# Patient Record
Sex: MALE | Race: WHITE | Employment: FULL TIME | URBAN - METROPOLITAN AREA
[De-identification: names, ages, dates, MRNs, and addresses within clinical notes are randomized per-mention and may not be internally consistent; named-entity substitution may affect disease eponyms.]

---

## 2017-06-21 ENCOUNTER — HOSPITAL ENCOUNTER (OUTPATIENT)
Facility: MEDICAL CENTER | Age: 52
End: 2017-06-21
Attending: INTERNAL MEDICINE
Payer: COMMERCIAL

## 2017-06-21 ENCOUNTER — OFFICE VISIT (OUTPATIENT)
Dept: MEDICAL GROUP | Facility: PHYSICIAN GROUP | Age: 52
End: 2017-06-21
Payer: COMMERCIAL

## 2017-06-21 VITALS
TEMPERATURE: 97.9 F | WEIGHT: 191 LBS | HEART RATE: 73 BPM | HEIGHT: 68 IN | OXYGEN SATURATION: 94 % | BODY MASS INDEX: 28.95 KG/M2 | SYSTOLIC BLOOD PRESSURE: 150 MMHG | DIASTOLIC BLOOD PRESSURE: 94 MMHG

## 2017-06-21 DIAGNOSIS — R39.9 URINARY TRACT INFECTION SYMPTOMS: ICD-10-CM

## 2017-06-21 DIAGNOSIS — R39.9 SYMPTOMS INVOLVING URINARY SYSTEM: ICD-10-CM

## 2017-06-21 DIAGNOSIS — M25.522 LEFT ELBOW PAIN: ICD-10-CM

## 2017-06-21 DIAGNOSIS — L85.9 HYPERKERATOSIS: ICD-10-CM

## 2017-06-21 LAB
APPEARANCE UR: CLEAR
BILIRUB UR STRIP-MCNC: NORMAL MG/DL
COLOR UR AUTO: YELLOW
GLUCOSE UR STRIP.AUTO-MCNC: NORMAL MG/DL
KETONES UR STRIP.AUTO-MCNC: NORMAL MG/DL
LEUKOCYTE ESTERASE UR QL STRIP.AUTO: NORMAL
NITRITE UR QL STRIP.AUTO: NORMAL
PH UR STRIP.AUTO: 6.5 [PH] (ref 5–8)
PROT UR QL STRIP: NORMAL MG/DL
RBC UR QL AUTO: NORMAL
SP GR UR STRIP.AUTO: 1.01
UROBILINOGEN UR STRIP-MCNC: NORMAL MG/DL

## 2017-06-21 PROCEDURE — 81002 URINALYSIS NONAUTO W/O SCOPE: CPT | Performed by: INTERNAL MEDICINE

## 2017-06-21 PROCEDURE — 81003 URINALYSIS AUTO W/O SCOPE: CPT

## 2017-06-21 PROCEDURE — 87491 CHLMYD TRACH DNA AMP PROBE: CPT

## 2017-06-21 PROCEDURE — 87591 N.GONORRHOEAE DNA AMP PROB: CPT

## 2017-06-21 PROCEDURE — 99213 OFFICE O/P EST LOW 20 MIN: CPT | Performed by: INTERNAL MEDICINE

## 2017-06-21 ASSESSMENT — PATIENT HEALTH QUESTIONNAIRE - PHQ9: CLINICAL INTERPRETATION OF PHQ2 SCORE: 0

## 2017-06-21 ASSESSMENT — PAIN SCALES - GENERAL: PAINLEVEL: NO PAIN

## 2017-06-21 NOTE — PROGRESS NOTES
Chief Complaint   Patient presents with   • Dysuria     burning when urinating, flow goes out to the side when urinating       HISTORY OF PRESENT ILLNESS: Patient is a 51 y.o. male established patient who presents today to discuss the medical issues below.    Symptoms involving urinary system  Few mos of intermittent and waxing and waning, now with pretty continuous dysuria, worse after voiding, no blood.   and monogamous.      Hyperkeratosis  Spots on neck he wants checked.        Patient Active Problem List    Diagnosis Date Noted   • Symptoms involving urinary system 06/21/2017   • Left elbow pain 06/21/2017   • Hyperkeratosis 06/21/2017   • Reflux esophagitis 10/22/2015   • Post-traumatic osteoarthritis of right ankle 07/23/2015   • Mass of finger 02/25/2013   • Elevated BP 11/13/2009   • Pure hypercholesterolemia 08/21/2009   • Constipation        Allergies:Review of patient's allergies indicates no known allergies.    Current Outpatient Prescriptions   Medication Sig Dispense Refill   • MULTIVITAMIN PO Take 1 Tab by mouth every day.       No current facility-administered medications for this visit.         Past Medical History   Diagnosis Date   • Pure hypercholesterolemia    • Constipation    • Other specified disorder of rectum and anus 9/2006     Proctitis    • Fatty liver    • S/P colonoscopy 2002     Benign   • Hiatal hernia    • Elevated BP 11/13/2009   • Mass of finger 2/25/2013   • Post-traumatic osteoarthritis of right ankle 7/23/2015   • Reflux esophagitis 10/22/2015   • Urinary tract infection symptoms 6/21/2017   • Symptoms involving urinary system 6/21/2017   • Hyperkeratosis 6/21/2017       Social History   Substance Use Topics   • Smoking status: Never Smoker    • Smokeless tobacco: Never Used   • Alcohol Use: 1.0 oz/week     2 drink(s) per week      Comment: occassional       Family Status   Relation Status Death Age   • Mother Alive      htn   • Father Alive    • Sister Alive    •  "Brother       wbc cancer     Family History   Problem Relation Age of Onset   • Hypertension Mother        ROS:    Respiratory: Negative for cough, sputum production, shortness of breath or wheezing.    Cardiovascular: Negative for chest pain, palpitations, orthopnea, dyspnea with exertion or edema.   Gastrointestinal: Negative for GI upset, nausea, vomiting, abdominal pain, constipation or diarrhea.   Genitourinary: Negative for dysuria, urgency, hesitancy or frequency.       Exam:    Blood pressure 150/94, pulse 73, temperature 36.6 °C (97.9 °F), height 1.727 m (5' 7.99\"), weight 86.637 kg (191 lb), SpO2 94 %.  General:  Well nourished, well developed male in NAD.  Skin: Multiple skin tags no signs of inflammation discoloration or pigmentation.  Pulmonary: Clear to ausculation and percussion.  Normal effort. No rales, rhonchi, or wheezing.  Cardiovascular: Regular rate and rhythm without murmur.   Abdomen: Normal bowel sounds soft and nontender no palpable liver spleen bladder mass.  : normal external male genitalia, bilateral descended testis, no masses, herniations, tenderness or rashes. Rectal with normal tone, no hemorrhoids, prostate is normal and symmetrically bilaterally sans masses.      Psych: Alert and oriented to person, place, and time. Appropriate mood and conversation.  Extremity: Minimal tenderness over the right olecranon process minimal increase with supination and no weakness      This dictation was created using voice recognition software. I have made reasonable attempts to correct errors, however, errors of grammar and content may exist.          Assessment/Plan:    1. Urinary tract infection symptoms  Most consistent with urethral stenosis. Prostate unremarkable urinalysis negative for consistency of urinary tract infection. Discussed with patient obtaining GC chlamydia by urine referral to urology for urethral Skippy  - URINALYSIS; Future    2. Symptoms involving urinary system  As " above  - CHLAMYDIA/GC PCR URINE OR SWAB; Future  - REFERRAL TO UROLOGY    3. 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 Most consistent with strain discussed position of function monitor with over-the-counter nonsteroidals     4. Hyperkeratosis  Benign hyperkeratosis liquid nitrogen applied if persisting will need scissor excision.       Patient was seen for  15 minutes face to face of which more than 50% of the time was spent in counseling and coordination of care regarding the above problems.

## 2017-06-21 NOTE — ASSESSMENT & PLAN NOTE
Few mos of intermittent and waxing and waning, now with pretty continuous dysuria, worse after voiding, no blood.   and monogamous.

## 2017-06-22 LAB
APPEARANCE UR: CLEAR
BILIRUB UR QL STRIP.AUTO: NEGATIVE
C TRACH DNA SPEC QL NAA+PROBE: NEGATIVE
COLOR UR: YELLOW
GLUCOSE UR STRIP.AUTO-MCNC: NEGATIVE MG/DL
KETONES UR STRIP.AUTO-MCNC: NEGATIVE MG/DL
LEUKOCYTE ESTERASE UR QL STRIP.AUTO: NEGATIVE
MICRO URNS: NORMAL
N GONORRHOEA DNA SPEC QL NAA+PROBE: NEGATIVE
NITRITE UR QL STRIP.AUTO: NEGATIVE
PH UR STRIP.AUTO: 7 [PH]
PROT UR QL STRIP: NEGATIVE MG/DL
RBC UR QL AUTO: NEGATIVE
SP GR UR STRIP.AUTO: 1.01
SPECIMEN SOURCE: NORMAL

## 2019-02-26 ENCOUNTER — HOSPITAL ENCOUNTER (OUTPATIENT)
Facility: MEDICAL CENTER | Age: 54
End: 2019-02-26
Attending: PHYSICIAN ASSISTANT
Payer: COMMERCIAL

## 2019-02-26 ENCOUNTER — OCCUPATIONAL MEDICINE (OUTPATIENT)
Dept: URGENT CARE | Facility: PHYSICIAN GROUP | Age: 54
End: 2019-02-26

## 2019-02-26 ENCOUNTER — APPOINTMENT (OUTPATIENT)
Dept: RADIOLOGY | Facility: IMAGING CENTER | Age: 54
End: 2019-02-26
Attending: PHYSICIAN ASSISTANT

## 2019-02-26 VITALS
BODY MASS INDEX: 29.4 KG/M2 | SYSTOLIC BLOOD PRESSURE: 110 MMHG | HEART RATE: 73 BPM | WEIGHT: 194 LBS | OXYGEN SATURATION: 97 % | HEIGHT: 68 IN | TEMPERATURE: 97.7 F | DIASTOLIC BLOOD PRESSURE: 80 MMHG | RESPIRATION RATE: 16 BRPM

## 2019-02-26 DIAGNOSIS — Z02.89 ENCOUNTER FOR PHYSICAL EXAMINATION RELATED TO EMPLOYMENT: ICD-10-CM

## 2019-02-26 DIAGNOSIS — Z02.89 ENCOUNTER FOR PHYSICAL EXAMINATION RELATED TO EMPLOYMENT: Primary | ICD-10-CM

## 2019-02-26 LAB
ALBUMIN SERPL BCP-MCNC: 4.8 G/DL (ref 3.2–4.9)
ALBUMIN/GLOB SERPL: 1.8 G/DL
ALP SERPL-CCNC: 72 U/L (ref 30–99)
ALT SERPL-CCNC: 34 U/L (ref 2–50)
ANION GAP SERPL CALC-SCNC: 4 MMOL/L (ref 0–11.9)
AST SERPL-CCNC: 21 U/L (ref 12–45)
BASOPHILS # BLD AUTO: 1 % (ref 0–1.8)
BASOPHILS # BLD: 0.07 K/UL (ref 0–0.12)
BILIRUB SERPL-MCNC: 0.6 MG/DL (ref 0.1–1.5)
BUN SERPL-MCNC: 16 MG/DL (ref 8–22)
CALCIUM SERPL-MCNC: 10.2 MG/DL (ref 8.5–10.5)
CHLORIDE SERPL-SCNC: 102 MMOL/L (ref 96–112)
CO2 SERPL-SCNC: 31 MMOL/L (ref 20–33)
CREAT SERPL-MCNC: 0.82 MG/DL (ref 0.5–1.4)
EOSINOPHIL # BLD AUTO: 0.19 K/UL (ref 0–0.51)
EOSINOPHIL NFR BLD: 2.8 % (ref 0–6.9)
ERYTHROCYTE [DISTWIDTH] IN BLOOD BY AUTOMATED COUNT: 42.1 FL (ref 35.9–50)
GLOBULIN SER CALC-MCNC: 2.6 G/DL (ref 1.9–3.5)
GLUCOSE SERPL-MCNC: 83 MG/DL (ref 65–99)
HCT VFR BLD AUTO: 50 % (ref 42–52)
HGB BLD-MCNC: 16.3 G/DL (ref 14–18)
IMM GRANULOCYTES # BLD AUTO: 0.01 K/UL (ref 0–0.11)
IMM GRANULOCYTES NFR BLD AUTO: 0.1 % (ref 0–0.9)
LYMPHOCYTES # BLD AUTO: 3.12 K/UL (ref 1–4.8)
LYMPHOCYTES NFR BLD: 46.8 % (ref 22–41)
MCH RBC QN AUTO: 30 PG (ref 27–33)
MCHC RBC AUTO-ENTMCNC: 32.6 G/DL (ref 33.7–35.3)
MCV RBC AUTO: 91.9 FL (ref 81.4–97.8)
MONOCYTES # BLD AUTO: 0.42 K/UL (ref 0–0.85)
MONOCYTES NFR BLD AUTO: 6.3 % (ref 0–13.4)
NEUTROPHILS # BLD AUTO: 2.86 K/UL (ref 1.82–7.42)
NEUTROPHILS NFR BLD: 43 % (ref 44–72)
NRBC # BLD AUTO: 0 K/UL
NRBC BLD-RTO: 0 /100 WBC
PLATELET # BLD AUTO: 286 K/UL (ref 164–446)
PMV BLD AUTO: 9.4 FL (ref 9–12.9)
POTASSIUM SERPL-SCNC: 4.1 MMOL/L (ref 3.6–5.5)
PROT SERPL-MCNC: 7.4 G/DL (ref 6–8.2)
PSA SERPL-MCNC: 1.11 NG/ML (ref 0–4)
RBC # BLD AUTO: 5.44 M/UL (ref 4.7–6.1)
SODIUM SERPL-SCNC: 137 MMOL/L (ref 135–145)
WBC # BLD AUTO: 6.7 K/UL (ref 4.8–10.8)

## 2019-02-26 PROCEDURE — 84202 ASSAY RBC PROTOPORPHYRIN: CPT | Performed by: PHYSICIAN ASSISTANT

## 2019-02-26 PROCEDURE — 8915 PR COMPREHENSIVE PHYSICAL: Performed by: PHYSICIAN ASSISTANT

## 2019-02-26 PROCEDURE — 84153 ASSAY OF PSA TOTAL: CPT | Performed by: PHYSICIAN ASSISTANT

## 2019-02-26 PROCEDURE — 71045 X-RAY EXAM CHEST 1 VIEW: CPT | Performed by: PHYSICIAN ASSISTANT

## 2019-02-26 PROCEDURE — 85025 COMPLETE CBC W/AUTO DIFF WBC: CPT | Performed by: PHYSICIAN ASSISTANT

## 2019-02-26 PROCEDURE — 71045 X-RAY EXAM CHEST 1 VIEW: CPT | Mod: TC,FY | Performed by: PHYSICIAN ASSISTANT

## 2019-02-26 PROCEDURE — 80053 COMPREHEN METABOLIC PANEL: CPT | Performed by: PHYSICIAN ASSISTANT

## 2019-02-26 PROCEDURE — 83655 ASSAY OF LEAD: CPT | Performed by: PHYSICIAN ASSISTANT

## 2019-02-26 ASSESSMENT — VISUAL ACUITY
OD_CC: 20/40
OS_CC: 20/50

## 2019-02-26 NOTE — PROGRESS NOTES
Patient is here for an employment physical and denies any issues at this time. All paperwork reviewed. Exam normal. Cleared for work.

## 2019-03-02 LAB — LEAD BLDV-MCNC: 18.2 UG/DL (ref 0–4.9)

## 2019-12-10 ENCOUNTER — OFFICE VISIT (OUTPATIENT)
Dept: MEDICAL GROUP | Facility: PHYSICIAN GROUP | Age: 54
End: 2019-12-10
Payer: COMMERCIAL

## 2019-12-10 VITALS
OXYGEN SATURATION: 96 % | HEART RATE: 72 BPM | HEIGHT: 68 IN | WEIGHT: 193 LBS | SYSTOLIC BLOOD PRESSURE: 144 MMHG | RESPIRATION RATE: 20 BRPM | BODY MASS INDEX: 29.25 KG/M2 | DIASTOLIC BLOOD PRESSURE: 86 MMHG | TEMPERATURE: 98 F

## 2019-12-10 DIAGNOSIS — E78.5 HYPERLIPIDEMIA, UNSPECIFIED HYPERLIPIDEMIA TYPE: ICD-10-CM

## 2019-12-10 DIAGNOSIS — Z13.89 SCREENING FOR BLOOD OR PROTEIN IN URINE: Primary | ICD-10-CM

## 2019-12-10 DIAGNOSIS — R31.0 GROSS HEMATURIA: ICD-10-CM

## 2019-12-10 DIAGNOSIS — K21.00 REFLUX ESOPHAGITIS: ICD-10-CM

## 2019-12-10 DIAGNOSIS — Z12.11 COLON CANCER SCREENING: ICD-10-CM

## 2019-12-10 DIAGNOSIS — I10 ESSENTIAL HYPERTENSION: ICD-10-CM

## 2019-12-10 DIAGNOSIS — Z12.5 PROSTATE CANCER SCREENING: ICD-10-CM

## 2019-12-10 LAB
APPEARANCE UR: CLEAR
BILIRUB UR STRIP-MCNC: NORMAL MG/DL
COLOR UR AUTO: NORMAL
GLUCOSE UR STRIP.AUTO-MCNC: NORMAL MG/DL
KETONES UR STRIP.AUTO-MCNC: NORMAL MG/DL
LEUKOCYTE ESTERASE UR QL STRIP.AUTO: NORMAL
NITRITE UR QL STRIP.AUTO: NORMAL
PH UR STRIP.AUTO: 5 [PH] (ref 5–8)
PROT UR QL STRIP: NORMAL MG/DL
RBC UR QL AUTO: NORMAL
SP GR UR STRIP.AUTO: 1.03
UROBILINOGEN UR STRIP-MCNC: 0.2 MG/DL

## 2019-12-10 PROCEDURE — 99214 OFFICE O/P EST MOD 30 MIN: CPT | Performed by: INTERNAL MEDICINE

## 2019-12-10 PROCEDURE — 81002 URINALYSIS NONAUTO W/O SCOPE: CPT | Performed by: INTERNAL MEDICINE

## 2019-12-10 ASSESSMENT — PAIN SCALES - GENERAL: PAINLEVEL: NO PAIN

## 2019-12-10 ASSESSMENT — PATIENT HEALTH QUESTIONNAIRE - PHQ9: CLINICAL INTERPRETATION OF PHQ2 SCORE: 0

## 2019-12-10 NOTE — PROGRESS NOTES
Chief Complaint   Patient presents with   • Hypertension   • Blood in Urine       HISTORY OF PRESENT ILLNESS: Patient is a 54 y.o. male established patient who presents today to discuss the medical issues below.    Gross hematuria  Patient alarmed due to episode x 1 last Sunday, went to urinate and noted a tip of blood on the end of the penis and at the end of urinating, not since, no pain no dysuria urgency or hesitancy, none since.     Reflux esophagitis  tums once in a while.          Patient Active Problem List    Diagnosis Date Noted   • Gross hematuria 12/10/2019   • Symptoms involving urinary system 06/21/2017   • Left elbow pain 06/21/2017   • Hyperkeratosis 06/21/2017   • Reflux esophagitis 10/22/2015   • Post-traumatic osteoarthritis of right ankle 07/23/2015   • Mass of finger 02/25/2013   • Elevated BP 11/13/2009   • Pure hypercholesterolemia 08/21/2009   • Constipation        Allergies:Patient has no known allergies.    Current Outpatient Medications   Medication Sig Dispense Refill   • miconazole (MICOTIN) 2 % Cream Apply 0.5 g to affected area(s) 2 times a day as needed (irritation). 1 Tube 0   • MULTIVITAMIN PO Take 1 Tab by mouth every day.       No current facility-administered medications for this visit.          Past Medical History:   Diagnosis Date   • Constipation    • Elevated BP 11/13/2009   • Fatty liver    • Hiatal hernia    • Hyperkeratosis 6/21/2017   • Mass of finger 2/25/2013   • Other specified disorder of rectum and anus 9/2006    Proctitis    • Post-traumatic osteoarthritis of right ankle 7/23/2015   • Pure hypercholesterolemia    • Reflux esophagitis 10/22/2015   • S/P colonoscopy 2002    Benign   • Symptoms involving urinary system 6/21/2017   • Urinary tract infection symptoms 6/21/2017       Social History     Tobacco Use   • Smoking status: Never Smoker   • Smokeless tobacco: Never Used   Substance Use Topics   • Alcohol use: Yes     Alcohol/week: 1.0 oz     Types: 2 drink(s)  "per week     Comment: occassional   • Drug use: No       Family Status   Relation Name Status   • Mo 65 Alive        htn   • Fa  Alive   • Sis  Alive   • Bro          wbc cancer     Family History   Problem Relation Age of Onset   • Hypertension Mother        ROS:    Respiratory: Negative for cough, sputum production, shortness of breath or wheezing.    Cardiovascular: Negative for chest pain, palpitations, orthopnea, dyspnea with exertion or edema.   Gastrointestinal: Negative for GI upset, nausea, vomiting, abdominal pain, constipation or diarrhea.   Genitourinary: Negative for dysuria, urgency, hesitancy or frequency.       Exam:    /86 (BP Location: Right arm, Patient Position: Sitting, BP Cuff Size: Adult)   Pulse 72   Temp 36.7 °C (98 °F) (Temporal)   Resp 20   Ht 1.727 m (5' 8\")   Wt 87.5 kg (193 lb)   SpO2 96%   General:  Well nourished, well developed male in NAD.  Pulmonary: Clear to ausculation and percussion.  Normal effort. No rales, rhonchi, or wheezing.  Cardiovascular: Regular rate and rhythm without murmur.   Abdomen: Normal bowel sounds soft and nontender no palpable liver spleen bladder mass.  : Normal external male genitalia bilateral descended testes no herniations.  No testicular masses.  Rectal with minimal external hemorrhoids some perirectal skin tags, good sphincter tone prostate 1+ bilaterally no nodularity no tenderness.  Extremities: No LE edema noted.  Neuro: Grossly nonfocal.  Psych: Alert and oriented to person, place, and time. Appropriate mood and conversation.    UA negative    This dictation was created using voice recognition software. I have made reasonable attempts to correct errors, however, errors of grammar and content may exist.          Assessment/Plan:    1. Gross hematuria  Clinically sounds consistent with distal urethral only there is minimal irritation suggestive of yeast infection.  Will monitor with PRN topical miconazole and monitor for any " recurrence.  Discussed additional monitoring as discussed below.  Standing UA PRN any recurrence.  Patient adamant sexually transmitted disease impossible.  - URINALYSIS,CULTURE IF INDICATED; Standing    2. Reflux esophagitis  Clinically stable rare PRN Tums    3. Colon cancer screening  Referral for colonoscopy  - REFERRAL TO GASTROENTEROLOGY    4. Prostate cancer screening  Ongoing monitoring with PSA  - PROSTATE SPECIFIC AG SCREENING; Future    5. Hyperlipidemia, unspecified hyperlipidemia type  Recommend labs with next blood draw  - Lipid Profile; Future    6. Essential hypertension  Outpatient blood pressure monitoring for consistency.  Consideration for medications discussed with the patient.  Goal blood pressure discussed.  - Comp Metabolic Panel; Future  - CBC WITH DIFFERENTIAL; Future    Patient was seen for 25 minutes face to face of which more than 50% of the time was spent in counseling and coordination of care regarding the above problems.

## 2019-12-10 NOTE — ASSESSMENT & PLAN NOTE
Patient alarmed due to episode x 1 last Sunday, went to urinate and noted a tip of blood on the end of the penis and at the end of urinating, not since, no pain no dysuria urgency or hesitancy, none since.

## 2020-02-18 ENCOUNTER — OCCUPATIONAL MEDICINE (OUTPATIENT)
Dept: URGENT CARE | Facility: PHYSICIAN GROUP | Age: 55
End: 2020-02-18

## 2020-02-18 VITALS
RESPIRATION RATE: 16 BRPM | TEMPERATURE: 98 F | SYSTOLIC BLOOD PRESSURE: 132 MMHG | HEART RATE: 89 BPM | DIASTOLIC BLOOD PRESSURE: 80 MMHG | BODY MASS INDEX: 28.79 KG/M2 | HEIGHT: 68 IN | OXYGEN SATURATION: 98 % | WEIGHT: 190 LBS

## 2020-02-18 DIAGNOSIS — Z02.89 EXAMINATION, PHYSICAL, EMPLOYEE: ICD-10-CM

## 2020-02-18 PROCEDURE — 8915 PR COMPREHENSIVE PHYSICAL: Performed by: PHYSICIAN ASSISTANT

## 2020-02-18 ASSESSMENT — VISUAL ACUITY
OS_CC: 20/25
OD_CC: 20/25

## 2021-02-16 ENCOUNTER — TELEMEDICINE (OUTPATIENT)
Dept: MEDICAL GROUP | Facility: PHYSICIAN GROUP | Age: 56
End: 2021-02-16
Payer: COMMERCIAL

## 2021-02-16 VITALS — HEIGHT: 68 IN | WEIGHT: 190 LBS | BODY MASS INDEX: 28.79 KG/M2 | HEART RATE: 80 BPM

## 2021-02-16 DIAGNOSIS — I10 ESSENTIAL HYPERTENSION: ICD-10-CM

## 2021-02-16 DIAGNOSIS — E78.00 PURE HYPERCHOLESTEROLEMIA: ICD-10-CM

## 2021-02-16 DIAGNOSIS — R31.0 GROSS HEMATURIA: ICD-10-CM

## 2021-02-16 DIAGNOSIS — R39.9 SYMPTOMS INVOLVING URINARY SYSTEM: ICD-10-CM

## 2021-02-16 PROCEDURE — 99213 OFFICE O/P EST LOW 20 MIN: CPT | Mod: 95,CR | Performed by: INTERNAL MEDICINE

## 2021-02-16 RX ORDER — AMOXICILLIN 500 MG/1
TABLET, FILM COATED ORAL
COMMUNITY
Start: 2021-02-01 | End: 2024-03-07

## 2021-02-16 ASSESSMENT — PATIENT HEALTH QUESTIONNAIRE - PHQ9: CLINICAL INTERPRETATION OF PHQ2 SCORE: 0

## 2021-02-16 ASSESSMENT — FIBROSIS 4 INDEX: FIB4 SCORE: 0.69

## 2021-02-16 NOTE — ASSESSMENT & PLAN NOTE
Patient reports he still has some post void discomfort, worse in the summer.  Notes trouble worse with less water intake.  No abdomen pain.  The spray is difficult to control.  No more hematuria since the cream used at last OV.

## 2021-02-16 NOTE — PROGRESS NOTES
Chief Complaint   Patient presents with   • Referral Needed     Urology       HISTORY OF PRESENT ILLNESS: Patient is a 55 y.o. male established patient who presents today to discuss the medical issues below.  Telemed, consent and limitations accepted by patient.     Gross hematuria  Patient reports he still has some post void discomfort, worse in the summer.  Notes trouble worse with less water intake.  No abdomen pain.  The spray is difficult to control.  No more hematuria since the cream used at last OV.     Essential hypertension  Patient following at home, generally in theh 120-130/70 range.      Pure hypercholesterolemia  Has not had his labs done yet.        Patient Active Problem List    Diagnosis Date Noted   • Gross hematuria 12/10/2019   • Symptoms involving urinary system 06/21/2017   • Left elbow pain 06/21/2017   • Hyperkeratosis 06/21/2017   • Reflux esophagitis 10/22/2015   • Post-traumatic osteoarthritis of right ankle 07/23/2015   • Mass of finger 02/25/2013   • Essential hypertension 11/13/2009   • Pure hypercholesterolemia 08/21/2009   • Constipation        Allergies:Patient has no known allergies.    Current Outpatient Medications   Medication Sig Dispense Refill   • MULTIVITAMIN PO Take 1 Tab by mouth every day.     • Amoxicillin 500 MG Tab TAKE 1 TABLET BY MOUTH THREE TIMES DAILY UNTIL GONE     • miconazole (MICOTIN) 2 % Cream Apply 0.5 g to affected area(s) 2 times a day as needed (irritation). (Patient not taking: Reported on 2/16/2021) 1 Tube 0     No current facility-administered medications for this visit.         Past Medical History:   Diagnosis Date   • Constipation    • Elevated BP 11/13/2009   • Fatty liver    • Hiatal hernia    • Hyperkeratosis 6/21/2017   • Mass of finger 2/25/2013   • Other specified disorder of rectum and anus 9/2006    Proctitis    • Post-traumatic osteoarthritis of right ankle 7/23/2015   • Pure hypercholesterolemia    • Reflux esophagitis 10/22/2015   • S/P  "colonoscopy 2002    Benign   • Symptoms involving urinary system 2017   • Urinary tract infection symptoms 2017       Social History     Tobacco Use   • Smoking status: Never Smoker   • Smokeless tobacco: Never Used   Substance Use Topics   • Alcohol use: Yes     Alcohol/week: 1.0 oz     Types: 2 drink(s) per week     Comment: occassional   • Drug use: No       Family Status   Relation Name Status   • Mo 65 Alive        htn   • Fa  Alive   • Sis  Alive   • Bro          wbc cancer     Family History   Problem Relation Age of Onset   • Hypertension Mother        ROS:    Respiratory: Negative for cough, sputum production, shortness of breath or wheezing.    Cardiovascular: Negative for chest pain, palpitations, orthopnea, dyspnea with exertion or edema.   Gastrointestinal: Negative for GI upset, nausea, vomiting, abdominal pain, constipation or diarrhea.   Genitourinary: Negative for dysuria, urgency, hesitancy or frequency.       Exam:    Pulse 80   Ht 1.727 m (5' 8\")   Wt 86.2 kg (190 lb)  Body mass index is 28.89 kg/m².  General:  Well nourished, well developed male in NAD.  Psych: Alert and oriented to person, place, and time. Appropriate mood and conversation.        This dictation was created using voice recognition software. I have made reasonable attempts to correct errors, however, errors of grammar and content may exist.          Assessment/Plan:    1. Gross hematuria  No additional gross hematuria, recommend Urology for full evaluation     2. Symptoms involving urinary system  Suggestive of some ureteral scaring, defer to urology. Needs labs as well.   - REFERRAL TO UROLOGY    3. Essential hypertension  Ongoing home monitoring     4. Pure hypercholesterolemia  Due for labs.     Patient was seen for 15 minutes face to face of which more than 50% of the time was spent in counseling and coordination of care regarding the above problems.           "

## 2021-02-26 ENCOUNTER — HOSPITAL ENCOUNTER (OUTPATIENT)
Dept: LAB | Facility: MEDICAL CENTER | Age: 56
End: 2021-02-26
Attending: INTERNAL MEDICINE
Payer: COMMERCIAL

## 2021-02-26 LAB
ALBUMIN SERPL BCP-MCNC: 4.3 G/DL (ref 3.2–4.9)
ALBUMIN/GLOB SERPL: 1.6 G/DL
ALP SERPL-CCNC: 78 U/L (ref 30–99)
ALT SERPL-CCNC: 46 U/L (ref 2–50)
ANION GAP SERPL CALC-SCNC: 6 MMOL/L (ref 7–16)
AST SERPL-CCNC: 26 U/L (ref 12–45)
BASOPHILS # BLD AUTO: 1.1 % (ref 0–1.8)
BASOPHILS # BLD: 0.06 K/UL (ref 0–0.12)
BILIRUB SERPL-MCNC: 0.6 MG/DL (ref 0.1–1.5)
BUN SERPL-MCNC: 17 MG/DL (ref 8–22)
CALCIUM SERPL-MCNC: 9.3 MG/DL (ref 8.5–10.5)
CHLORIDE SERPL-SCNC: 105 MMOL/L (ref 96–112)
CHOLEST SERPL-MCNC: 208 MG/DL (ref 100–199)
CO2 SERPL-SCNC: 30 MMOL/L (ref 20–33)
CREAT SERPL-MCNC: 0.74 MG/DL (ref 0.5–1.4)
EOSINOPHIL # BLD AUTO: 0.15 K/UL (ref 0–0.51)
EOSINOPHIL NFR BLD: 2.7 % (ref 0–6.9)
ERYTHROCYTE [DISTWIDTH] IN BLOOD BY AUTOMATED COUNT: 42.6 FL (ref 35.9–50)
FASTING STATUS PATIENT QL REPORTED: NORMAL
GLOBULIN SER CALC-MCNC: 2.7 G/DL (ref 1.9–3.5)
GLUCOSE SERPL-MCNC: 93 MG/DL (ref 65–99)
HCT VFR BLD AUTO: 47.5 % (ref 42–52)
HDLC SERPL-MCNC: 51 MG/DL
HGB BLD-MCNC: 15.6 G/DL (ref 14–18)
IMM GRANULOCYTES # BLD AUTO: 0.01 K/UL (ref 0–0.11)
IMM GRANULOCYTES NFR BLD AUTO: 0.2 % (ref 0–0.9)
LDLC SERPL CALC-MCNC: 138 MG/DL
LYMPHOCYTES # BLD AUTO: 2.34 K/UL (ref 1–4.8)
LYMPHOCYTES NFR BLD: 42.7 % (ref 22–41)
MCH RBC QN AUTO: 30.3 PG (ref 27–33)
MCHC RBC AUTO-ENTMCNC: 32.8 G/DL (ref 33.7–35.3)
MCV RBC AUTO: 92.2 FL (ref 81.4–97.8)
MONOCYTES # BLD AUTO: 0.47 K/UL (ref 0–0.85)
MONOCYTES NFR BLD AUTO: 8.6 % (ref 0–13.4)
NEUTROPHILS # BLD AUTO: 2.45 K/UL (ref 1.82–7.42)
NEUTROPHILS NFR BLD: 44.7 % (ref 44–72)
NRBC # BLD AUTO: 0 K/UL
NRBC BLD-RTO: 0 /100 WBC
PLATELET # BLD AUTO: 246 K/UL (ref 164–446)
PMV BLD AUTO: 10 FL (ref 9–12.9)
POTASSIUM SERPL-SCNC: 4.6 MMOL/L (ref 3.6–5.5)
PROT SERPL-MCNC: 7 G/DL (ref 6–8.2)
RBC # BLD AUTO: 5.15 M/UL (ref 4.7–6.1)
SODIUM SERPL-SCNC: 141 MMOL/L (ref 135–145)
TRIGL SERPL-MCNC: 93 MG/DL (ref 0–149)
WBC # BLD AUTO: 5.5 K/UL (ref 4.8–10.8)

## 2021-02-26 PROCEDURE — 81001 URINALYSIS AUTO W/SCOPE: CPT

## 2021-02-26 PROCEDURE — 84153 ASSAY OF PSA TOTAL: CPT

## 2021-02-26 PROCEDURE — 85025 COMPLETE CBC W/AUTO DIFF WBC: CPT

## 2021-02-26 PROCEDURE — 80061 LIPID PANEL: CPT

## 2021-02-26 PROCEDURE — 36415 COLL VENOUS BLD VENIPUNCTURE: CPT

## 2021-02-26 PROCEDURE — 80053 COMPREHEN METABOLIC PANEL: CPT

## 2021-02-27 LAB
AMORPH CRY #/AREA URNS HPF: PRESENT /HPF
APPEARANCE UR: ABNORMAL
BACTERIA #/AREA URNS HPF: ABNORMAL /HPF
BILIRUB UR QL STRIP.AUTO: NEGATIVE
COLOR UR: YELLOW
EPI CELLS #/AREA URNS HPF: ABNORMAL /HPF
GLUCOSE UR STRIP.AUTO-MCNC: NEGATIVE MG/DL
KETONES UR STRIP.AUTO-MCNC: NEGATIVE MG/DL
LEUKOCYTE ESTERASE UR QL STRIP.AUTO: NEGATIVE
MICRO URNS: ABNORMAL
NITRITE UR QL STRIP.AUTO: NEGATIVE
PH UR STRIP.AUTO: 6 [PH] (ref 5–8)
PROT UR QL STRIP: NEGATIVE MG/DL
PSA SERPL-MCNC: 1.25 NG/ML (ref 0–4)
RBC UR QL AUTO: ABNORMAL
SP GR UR STRIP.AUTO: >=1.03
UROBILINOGEN UR STRIP.AUTO-MCNC: 0.2 MG/DL
WBC #/AREA URNS HPF: ABNORMAL /HPF

## 2021-03-09 ENCOUNTER — OFFICE VISIT (OUTPATIENT)
Dept: URGENT CARE | Facility: PHYSICIAN GROUP | Age: 56
End: 2021-03-09

## 2021-03-09 DIAGNOSIS — Z01.00 EYE EXAM NORMAL: ICD-10-CM

## 2021-03-09 PROCEDURE — 8916 PR CLEARANCE ONLY: Performed by: PHYSICIAN ASSISTANT

## 2022-03-07 ENCOUNTER — OFFICE VISIT (OUTPATIENT)
Dept: URGENT CARE | Facility: PHYSICIAN GROUP | Age: 57
End: 2022-03-07

## 2022-03-07 ENCOUNTER — HOSPITAL ENCOUNTER (OUTPATIENT)
Facility: MEDICAL CENTER | Age: 57
End: 2022-03-07
Attending: NURSE PRACTITIONER
Payer: COMMERCIAL

## 2022-03-07 VITALS
RESPIRATION RATE: 16 BRPM | HEIGHT: 68 IN | SYSTOLIC BLOOD PRESSURE: 142 MMHG | OXYGEN SATURATION: 96 % | BODY MASS INDEX: 28.79 KG/M2 | DIASTOLIC BLOOD PRESSURE: 84 MMHG | HEART RATE: 96 BPM | WEIGHT: 190 LBS

## 2022-03-07 DIAGNOSIS — Z02.89 ENCOUNTER FOR PHYSICAL EXAMINATION RELATED TO EMPLOYMENT: ICD-10-CM

## 2022-03-07 DIAGNOSIS — Z02.89 ENCOUNTER FOR PHYSICAL EXAMINATION RELATED TO EMPLOYMENT: Primary | ICD-10-CM

## 2022-03-07 LAB
CHOLEST SERPL-MCNC: 165 MG/DL (ref 100–199)
HDLC SERPL-MCNC: 46 MG/DL
LDLC SERPL CALC-MCNC: 96 MG/DL
TRIGL SERPL-MCNC: 114 MG/DL (ref 0–149)

## 2022-03-07 PROCEDURE — 80061 LIPID PANEL: CPT | Performed by: NURSE PRACTITIONER

## 2022-03-07 PROCEDURE — 8915 PR COMPREHENSIVE PHYSICAL: Performed by: NURSE PRACTITIONER

## 2022-03-07 ASSESSMENT — VISUAL ACUITY
OS_CC: 20/25
OD_CC: 20/25

## 2022-03-07 ASSESSMENT — FIBROSIS 4 INDEX: FIB4 SCORE: 0.87

## 2022-03-07 NOTE — PROGRESS NOTES
"Subjective:     Long Mobley is a 56 y.o. male who presents for Employment Physical ([employer])      HPI  Pt presents for evaluation of a new work physical.     ROS    PMH:   Past Medical History:   Diagnosis Date   • Constipation    • Elevated BP 11/13/2009   • Fatty liver    • Hiatal hernia    • Hyperkeratosis 6/21/2017   • Mass of finger 2/25/2013   • Other specified disorder of rectum and anus 9/2006    Proctitis    • Post-traumatic osteoarthritis of right ankle 7/23/2015   • Pure hypercholesterolemia    • Reflux esophagitis 10/22/2015   • S/P colonoscopy 2002    Benign   • Symptoms involving urinary system 6/21/2017   • Urinary tract infection symptoms 6/21/2017     ALLERGIES: No Known Allergies  SURGHX:   Past Surgical History:   Procedure Laterality Date   • FREDERIC BY LAPAROSCOPY       SOCHX:   Social History     Socioeconomic History   • Marital status: Single   Tobacco Use   • Smoking status: Never Smoker   • Smokeless tobacco: Never Used   Substance and Sexual Activity   • Alcohol use: Yes     Alcohol/week: 1.0 oz     Types: 2 drink(s) per week     Comment: occassional   • Drug use: No   • Sexual activity: Yes     Partners: Female   Other Topics Concern   • Exercise Yes     Comment: hikes on weekends     FH:   Family History   Problem Relation Age of Onset   • Hypertension Mother          Objective:   /84   Pulse 96   Resp 16   Ht 1.727 m (5' 8\")   Wt 86.2 kg (190 lb)   SpO2 96%   BMI 28.89 kg/m²     Physical Exam  Please see scanned physical assessment form  Assessment/Plan:   Assessment    1. Encounter for physical examination related to employment       Patient cleared for all work duties.    "

## 2024-03-07 ENCOUNTER — OFFICE VISIT (OUTPATIENT)
Dept: URGENT CARE | Facility: PHYSICIAN GROUP | Age: 59
End: 2024-03-07
Payer: COMMERCIAL

## 2024-03-07 VITALS
TEMPERATURE: 97.7 F | DIASTOLIC BLOOD PRESSURE: 80 MMHG | SYSTOLIC BLOOD PRESSURE: 136 MMHG | WEIGHT: 190 LBS | RESPIRATION RATE: 16 BRPM | BODY MASS INDEX: 28.79 KG/M2 | HEIGHT: 68 IN | OXYGEN SATURATION: 94 % | HEART RATE: 92 BPM

## 2024-03-07 DIAGNOSIS — J40 BRONCHITIS: ICD-10-CM

## 2024-03-07 DIAGNOSIS — J01.00 ACUTE NON-RECURRENT MAXILLARY SINUSITIS: ICD-10-CM

## 2024-03-07 PROCEDURE — 3079F DIAST BP 80-89 MM HG: CPT | Performed by: NURSE PRACTITIONER

## 2024-03-07 PROCEDURE — 99213 OFFICE O/P EST LOW 20 MIN: CPT | Performed by: NURSE PRACTITIONER

## 2024-03-07 PROCEDURE — 3075F SYST BP GE 130 - 139MM HG: CPT | Performed by: NURSE PRACTITIONER

## 2024-03-07 RX ORDER — PREDNISONE 10 MG/1
20 TABLET ORAL DAILY
Qty: 6 TABLET | Refills: 0 | Status: SHIPPED | OUTPATIENT
Start: 2024-03-07 | End: 2024-03-10

## 2024-03-07 RX ORDER — DOXYCYCLINE HYCLATE 100 MG
100 TABLET ORAL 2 TIMES DAILY
Qty: 14 TABLET | Refills: 0 | Status: SHIPPED | OUTPATIENT
Start: 2024-03-07 | End: 2024-03-14

## 2024-03-07 RX ORDER — ALBUTEROL SULFATE 90 UG/1
2 AEROSOL, METERED RESPIRATORY (INHALATION) EVERY 6 HOURS PRN
Qty: 8.5 G | Refills: 0 | Status: SHIPPED | OUTPATIENT
Start: 2024-03-07

## 2024-03-07 RX ORDER — GUAIFENESIN 600 MG/1
600 TABLET, EXTENDED RELEASE ORAL EVERY 12 HOURS
Qty: 28 TABLET | Refills: 0 | Status: SHIPPED | OUTPATIENT
Start: 2024-03-07 | End: 2024-03-21

## 2024-03-07 NOTE — PROGRESS NOTES
"Subjective:   Long Mobley is a 58 y.o. male who presents for Cough (Was sick in January, was getting better but cough coming back and getting worse and barky. Congestion. Some wheezing when laying down. )    Patient is a 58-year-old male who presents clinic today reporting since January he has had a congested cough that is worse in the morning with thick brown mucus, intermittent shortness of breath, wheezing, and and nasal congestion with thick nasal discharge and maxillary sinus pain or pressure.  He is not taking any over-the-counter medications.  Symptoms seem to be worse at night, reports barky sounding cough and wheezing when laying down.  He has not had any fevers, chills, chest pain, palpitations, or leg swelling.  No over-the-counter medications have been tried.  He states symptoms did start back in January with flulike symptoms that never fully resolved.    Medications, Allergies, and current problem list reviewed today in Epic.     Objective:     /80   Pulse 92   Temp 36.5 °C (97.7 °F) (Temporal)   Resp 16   Ht 1.727 m (5' 8\")   Wt 86.2 kg (190 lb)   SpO2 94%     Physical Exam  Vitals reviewed.   Constitutional:       Appearance: Normal appearance.   HENT:      Head: Normocephalic.      Nose: Mucosal edema, congestion and rhinorrhea present. Rhinorrhea is clear.      Right Sinus: Maxillary sinus tenderness present.      Left Sinus: Maxillary sinus tenderness present.      Mouth/Throat:      Mouth: Mucous membranes are moist.      Pharynx: No posterior oropharyngeal erythema.   Eyes:      Extraocular Movements: Extraocular movements intact.      Conjunctiva/sclera: Conjunctivae normal.      Pupils: Pupils are equal, round, and reactive to light.   Cardiovascular:      Rate and Rhythm: Normal rate.   Pulmonary:      Effort: Pulmonary effort is normal. No respiratory distress.      Breath sounds: Normal breath sounds. No stridor. No wheezing, rhonchi or rales.      Comments: Bronchospastic " cough.  Musculoskeletal:         General: Normal range of motion.      Cervical back: Normal range of motion and neck supple.   Skin:     General: Skin is warm and dry.   Neurological:      General: No focal deficit present.      Mental Status: He is alert and oriented to person, place, and time.   Psychiatric:         Mood and Affect: Mood normal.         Behavior: Behavior normal.         Assessment/Plan:     Diagnosis and associated orders:     1. Bronchitis  guaiFENesin ER (MUCINEX) 600 MG TABLET SR 12 HR    predniSONE (DELTASONE) 10 MG Tab    albuterol 108 (90 Base) MCG/ACT Aero Soln inhalation aerosol    doxycycline (VIBRAMYCIN) 100 MG Tab      2. Acute non-recurrent maxillary sinusitis  doxycycline (VIBRAMYCIN) 100 MG Tab         Comments/MDM:     Is acute condition.  Lung sounds are clear on physical exam.  Pulse ox was placed on patient ranging between 94 and 96% on room air.  Patient is speaking full sentences.  Low suspicion at this time for pneumonia.  Bronchospastic cough heard.  Patient does have maxillary sinus pain and pressure, rhinorrhea present.  Recommended patient start on prednisone, guaifenesin, and albuterol as needed.  Recommended picking up over-the-counter Sudafed, Flonase, and saline nasal spray.  If symptoms or not improving in the next 3 to 5 days recommended starting on doxycycline, patient was given paper prescription.  Patient was involved with shared decision-making throughout the exam today and verbalizes understanding regards to plan of care, discharge instructions, and follow-up         Differential diagnosis, natural history, supportive care, and indications for immediate follow-up discussed.    Advised the patient to follow-up with the primary care physician for recheck, reevaluation, and consideration of further management.    I personally reviewed prior external notes and test results pertinent to today's visit as well as additional imaging and testing completed in clinic  today.     Please note that this dictation was created using voice recognition software. I have made a reasonable attempt to correct obvious errors, but I expect that there are errors of grammar and possibly content that I did not discover before finalizing the note.

## 2024-08-24 ENCOUNTER — APPOINTMENT (OUTPATIENT)
Dept: URGENT CARE | Facility: PHYSICIAN GROUP | Age: 59
End: 2024-08-24
Payer: COMMERCIAL

## 2025-05-07 ENCOUNTER — OFFICE VISIT (OUTPATIENT)
Dept: URGENT CARE | Facility: PHYSICIAN GROUP | Age: 60
End: 2025-05-07
Payer: COMMERCIAL

## 2025-05-07 VITALS
SYSTOLIC BLOOD PRESSURE: 166 MMHG | WEIGHT: 205.3 LBS | HEIGHT: 67 IN | OXYGEN SATURATION: 92 % | DIASTOLIC BLOOD PRESSURE: 88 MMHG | TEMPERATURE: 99.7 F | RESPIRATION RATE: 18 BRPM | BODY MASS INDEX: 32.22 KG/M2 | HEART RATE: 106 BPM

## 2025-05-07 DIAGNOSIS — R50.9 FEVER, UNSPECIFIED FEVER CAUSE: ICD-10-CM

## 2025-05-07 PROCEDURE — 99213 OFFICE O/P EST LOW 20 MIN: CPT | Performed by: NURSE PRACTITIONER

## 2025-05-07 RX ORDER — ACETAMINOPHEN 500 MG
1000 TABLET ORAL ONCE
Status: COMPLETED | OUTPATIENT
Start: 2025-05-07 | End: 2025-05-07

## 2025-05-07 RX ADMIN — Medication 1000 MG: at 16:27

## 2025-05-07 NOTE — PROGRESS NOTES
"Subjective:   Long Mobley is a 59 y.o. male who presents for Cough (Went to [place] and was in a hotel. Lingering cough for a month. Mucus. Suspected fever this morning. )    Patient is a 59-year-old male presenting clinic today reporting 1 month history of waxing and waning cough with chest congestion and intermittent fevers, headaches, and fatigue.  Patient states that over the past 5 to 7 days symptoms have been worsening and he is felt more ill, fatigued, and is having more congestion in his chest and in the back of his throat.  Patient states that he woke up this morning coughing and choking on a large amount of mucus in the back of his throat.  Patient is also had intermittent sore throat and does feel mildly short of breath.  Patient denies any chest pain, palpitations, wheezing, or bodyaches.    Medications, Allergies, and current problem list reviewed today in Epic.     Objective:     BP (!) 162/88   Pulse (!) 108   Temp (!) 38.2 °C (100.7 °F) (Oral)   Resp 16   Ht 1.702 m (5' 7\")   Wt 93.1 kg (205 lb 4.8 oz)   SpO2 93%     Physical Exam  Vitals reviewed.   Constitutional:       General: He is not in acute distress.     Appearance: Normal appearance. He is not ill-appearing or toxic-appearing.   HENT:      Head: Normocephalic.      Right Ear: Tympanic membrane, ear canal and external ear normal.      Left Ear: Tympanic membrane, ear canal and external ear normal.      Nose: Mucosal edema, congestion and rhinorrhea present.      Right Sinus: No maxillary sinus tenderness or frontal sinus tenderness.      Left Sinus: No maxillary sinus tenderness or frontal sinus tenderness.      Mouth/Throat:      Lips: Pink. No lesions.      Mouth: Mucous membranes are moist.      Pharynx: Pharyngeal swelling, posterior oropharyngeal erythema and uvula swelling present. No oropharyngeal exudate.      Tonsils: No tonsillar exudate or tonsillar abscesses. 1+ on the right. 2+ on the left.      Comments: Uvula swollen " with edema noted on left side, positive for uvula deviation.  Left tonsil mildly enlarged compared to right.  Eyes:      Extraocular Movements: Extraocular movements intact.      Conjunctiva/sclera: Conjunctivae normal.      Pupils: Pupils are equal, round, and reactive to light.   Cardiovascular:      Rate and Rhythm: Regular rhythm. Tachycardia present.   Pulmonary:      Effort: Pulmonary effort is normal. No tachypnea, bradypnea, accessory muscle usage or respiratory distress.      Breath sounds: Decreased breath sounds present. No wheezing or rhonchi.   Musculoskeletal:         General: Normal range of motion.      Cervical back: Normal range of motion and neck supple. No rigidity or tenderness.   Lymphadenopathy:      Cervical: No cervical adenopathy.   Skin:     General: Skin is warm and dry.   Neurological:      General: No focal deficit present.      Mental Status: He is alert and oriented to person, place, and time.   Psychiatric:         Mood and Affect: Mood normal.         Behavior: Behavior normal.         Assessment/Plan:     Diagnosis and associated orders:     1. Fever, unspecified fever cause  acetaminophen (Tylenol) tablet 1,000 mg         Comments/MDM:     This is an acute condition.  Patient is nontoxic.  No acute distress.  Airway is patent patient is speaking full sentences.  On physical exam patient does have diminished lung sounds throughout, negative for rhonchi, rales, or wheezing.  Oropharynx shows uvula deviation to right with edema noted on left side of uvula along with left tonsillar enlargement compared to right.  Patient does have elevated blood pressure in clinic, he has fever 100.7, tachycardic, and decreased oxygen saturation 93 to 95% on room air with pulse oximetry.  Patient does not have any increased respiratory rate or effort.  Discussed with patient and wife differentials and I am concerned for bacterial component, tonsillar abscess, pneumonia, or even possible sepsis.    At  this time, I feel the patient requires a higher level of care including closer monitoring, stat lab work and/or imaging for further evaluation for complaints. This has been discussed with the patient and they state agreement and understanding.  I offered the patient an ambulance ride and  the patient is declining at this time. The patient is in no acute distress upon clinic departure and will go directly to ED without delay.  Acetaminophen was administered prior to transfer.  1626-report given to [facility] emergency department provider Estelle.  All questions were answered.  Patient did leave stable condition.  Wife does agreed to drive directly to the [facility] ER and to not delay care.  Patient was involved with shared decision-making throughout the exam today and verbalizes understanding         Differential diagnosis, natural history, supportive care, and indications for immediate follow-up discussed.    Advised the patient to follow-up with the primary care physician for recheck, reevaluation, and consideration of further management.    I personally reviewed prior external notes and test results pertinent to today's visit as well as additional imaging and testing completed in clinic today.     Please note that this dictation was created using voice recognition software. I have made a reasonable attempt to correct obvious errors, but I expect that there are errors of grammar and possibly content that I did not discover before finalizing the note.